# Patient Record
Sex: FEMALE | Race: WHITE | URBAN - METROPOLITAN AREA
[De-identification: names, ages, dates, MRNs, and addresses within clinical notes are randomized per-mention and may not be internally consistent; named-entity substitution may affect disease eponyms.]

---

## 2017-09-01 ENCOUNTER — OFFICE VISIT (OUTPATIENT)
Dept: OTOLARYNGOLOGY | Facility: CLINIC | Age: 82
End: 2017-09-01

## 2017-09-01 VITALS
SYSTOLIC BLOOD PRESSURE: 119 MMHG | BODY MASS INDEX: 22.38 KG/M2 | HEIGHT: 60 IN | DIASTOLIC BLOOD PRESSURE: 52 MMHG | WEIGHT: 114 LBS

## 2017-09-01 DIAGNOSIS — R07.0 THROAT DISCOMFORT: Primary | ICD-10-CM

## 2017-09-01 PROCEDURE — 99212 OFFICE O/P EST SF 10 MIN: CPT | Performed by: OTOLARYNGOLOGY

## 2017-09-01 PROCEDURE — 99214 OFFICE O/P EST MOD 30 MIN: CPT | Performed by: OTOLARYNGOLOGY

## 2017-09-01 RX ORDER — LORATADINE 10 MG/1
10 TABLET ORAL DAILY
Qty: 30 TABLET | Refills: 3 | Status: SHIPPED | OUTPATIENT
Start: 2017-09-01

## 2017-09-01 RX ORDER — HYDROCHLOROTHIAZIDE 25 MG/1
TABLET ORAL
COMMUNITY
Start: 2017-08-23

## 2017-09-01 RX ORDER — MONTELUKAST SODIUM 10 MG/1
10 TABLET ORAL NIGHTLY
Qty: 30 TABLET | Refills: 3 | Status: SHIPPED | OUTPATIENT
Start: 2017-09-01 | End: 2018-08-01

## 2017-09-01 RX ORDER — FLUTICASONE PROPIONATE 50 MCG
SPRAY, SUSPENSION (ML) NASAL
COMMUNITY
Start: 2017-08-25

## 2017-09-01 NOTE — PROGRESS NOTES
Harpreet Montgomery is a 80year old female. Patient presents with:  Throat Problem: constantly clearing her throat for a month      HISTORY OF PRESENT ILLNESS     10/14 I have seen her in the past for ear pressure/eustachian tube dysfunction issues.  More recent Respiratory Negative Dyspnea and wheezing. Cardio Negative Chest pain, irregular heartbeat/palpitations and syncope. GI Negative Abdominal pain and diarrhea. Endocrine Negative Cold intolerance and heat intolerance. Neuro Negative Tremors.    Psyc Diphenhydramine-Phenylephrine ([facility]), TAKE 1 TABLET BY MOUTH TWICE DAILY, Disp: , Rfl: 2  •  Fluticasone Propionate 50 MCG/ACT Nasal Suspension, , Disp: , Rfl:   •  hydrochlorothiazide 25 MG Oral Tab, , Disp: , Rfl:   •  Montelukast Sodium 10 M

## 2017-10-06 ENCOUNTER — OFFICE VISIT (OUTPATIENT)
Dept: OTOLARYNGOLOGY | Facility: CLINIC | Age: 82
End: 2017-10-06

## 2017-10-06 VITALS
DIASTOLIC BLOOD PRESSURE: 74 MMHG | TEMPERATURE: 97 F | BODY MASS INDEX: 22.38 KG/M2 | SYSTOLIC BLOOD PRESSURE: 122 MMHG | WEIGHT: 114 LBS | HEIGHT: 60 IN

## 2017-10-06 DIAGNOSIS — R07.0 THROAT DISCOMFORT: Primary | ICD-10-CM

## 2017-10-06 PROCEDURE — 99214 OFFICE O/P EST MOD 30 MIN: CPT | Performed by: OTOLARYNGOLOGY

## 2017-10-06 PROCEDURE — 99212 OFFICE O/P EST SF 10 MIN: CPT | Performed by: OTOLARYNGOLOGY

## 2017-10-06 NOTE — PROGRESS NOTES
Marco Antonio Salcido is a 80year old female. Patient presents with:   Follow - Up: 1 month follow up- throat discomfort- per pt there is some chages/improvement of symptoms      HISTORY OF PRESENT ILLNESS     10/14 I have seen her in the past for ear pressure/eus Smokeless tobacco: Never Used                        Alcohol use: No              Drug use: No            Other Topics            Concern  Caffeine Concern        No        No family history on file.     P Left: Normal. TM - Right: Normal, Left: Normal.   Skin Normal Inspection - Normal.        Lymph Detail Normal Submental. Submandibular. Anterior cervical. Posterior cervical. Supraclavicular.         Nose/Mouth/Throat Normal External nose - Normal. Lips/debbie Throat discomfort  Continue Singulair and loratadine as it appears to help with her complaints of nasal congestion and postnasal drip. Still with a scaly mouth in the mornings when she awakens.   Discussion with her health care keeper reveals that she uses

## 2017-10-31 ENCOUNTER — CHARTING TRANS (OUTPATIENT)
Dept: OTHER | Age: 82
End: 2017-10-31

## 2018-08-02 RX ORDER — MONTELUKAST SODIUM 10 MG/1
TABLET ORAL
Qty: 30 TABLET | Refills: 3 | Status: SHIPPED | OUTPATIENT
Start: 2018-08-02

## (undated) NOTE — LETTER
Leonela Robin  [address]  [place], [address]        09/01/17        Patient: Dilia Aldana   YOB: 1922   Date of Visit: 9/1/2017       Dear  Dr. Cary Price,      Thank you for referring Dilia Aldana to my practice.   Please find